# Patient Record
Sex: MALE | Race: OTHER | HISPANIC OR LATINO | URBAN - METROPOLITAN AREA
[De-identification: names, ages, dates, MRNs, and addresses within clinical notes are randomized per-mention and may not be internally consistent; named-entity substitution may affect disease eponyms.]

---

## 2021-05-29 ENCOUNTER — EMERGENCY (EMERGENCY)
Facility: HOSPITAL | Age: 56
LOS: 1 days | Discharge: HOME | End: 2021-05-29
Attending: EMERGENCY MEDICINE | Admitting: EMERGENCY MEDICINE
Payer: MEDICAID

## 2021-05-29 VITALS
TEMPERATURE: 97 F | RESPIRATION RATE: 16 BRPM | HEART RATE: 72 BPM | DIASTOLIC BLOOD PRESSURE: 49 MMHG | SYSTOLIC BLOOD PRESSURE: 93 MMHG | OXYGEN SATURATION: 100 %

## 2021-05-29 VITALS
SYSTOLIC BLOOD PRESSURE: 110 MMHG | TEMPERATURE: 98 F | HEART RATE: 60 BPM | DIASTOLIC BLOOD PRESSURE: 78 MMHG | RESPIRATION RATE: 17 BRPM | OXYGEN SATURATION: 100 %

## 2021-05-29 PROCEDURE — 70450 CT HEAD/BRAIN W/O DYE: CPT | Mod: 26

## 2021-05-29 PROCEDURE — 99285 EMERGENCY DEPT VISIT HI MDM: CPT

## 2021-05-29 PROCEDURE — 72125 CT NECK SPINE W/O DYE: CPT | Mod: 26

## 2021-05-29 RX ORDER — TETANUS TOXOID, REDUCED DIPHTHERIA TOXOID AND ACELLULAR PERTUSSIS VACCINE, ADSORBED 5; 2.5; 8; 8; 2.5 [IU]/.5ML; [IU]/.5ML; UG/.5ML; UG/.5ML; UG/.5ML
0.5 SUSPENSION INTRAMUSCULAR ONCE
Refills: 0 | Status: COMPLETED | OUTPATIENT
Start: 2021-05-29 | End: 2021-05-29

## 2021-05-29 RX ORDER — METOCLOPRAMIDE HCL 10 MG
10 TABLET ORAL ONCE
Refills: 0 | Status: COMPLETED | OUTPATIENT
Start: 2021-05-29 | End: 2021-05-29

## 2021-05-29 RX ORDER — ACETAMINOPHEN 500 MG
975 TABLET ORAL ONCE
Refills: 0 | Status: COMPLETED | OUTPATIENT
Start: 2021-05-29 | End: 2021-05-29

## 2021-05-29 RX ORDER — SODIUM CHLORIDE 9 MG/ML
1000 INJECTION, SOLUTION INTRAVENOUS ONCE
Refills: 0 | Status: COMPLETED | OUTPATIENT
Start: 2021-05-29 | End: 2021-05-29

## 2021-05-29 RX ADMIN — Medication 10 MILLIGRAM(S): at 03:34

## 2021-05-29 RX ADMIN — Medication 975 MILLIGRAM(S): at 03:34

## 2021-05-29 RX ADMIN — TETANUS TOXOID, REDUCED DIPHTHERIA TOXOID AND ACELLULAR PERTUSSIS VACCINE, ADSORBED 0.5 MILLILITER(S): 5; 2.5; 8; 8; 2.5 SUSPENSION INTRAMUSCULAR at 03:34

## 2021-05-29 RX ADMIN — SODIUM CHLORIDE 1000 MILLILITER(S): 9 INJECTION, SOLUTION INTRAVENOUS at 03:35

## 2021-05-29 NOTE — ED PROVIDER NOTE - NS ED ROS FT
GENERAL: No fever or chills  EYES: No change in vision  HEENT: No trouble swallowing or speaking  CARDIAC: No chest pain  PULMONARY: No cough or SOB  GI: No abdominal pain, no nausea or no vomiting, no diarrhea or constipation  : No changes in urination  SKIN: +laceration  NEURO: +headache; no numbness  MSK: No joint pain  Otherwise as HPI or negative.

## 2021-05-29 NOTE — ED ADULT NURSE NOTE - HOW OFTEN DO YOU HAVE SIX OR MORE DRINKS ON ONE OCCASION?
Patient discharged per MD orders. Instructions given on medications, wound care, activity, signs of infection, when to call MD, and follow up appointments. Pt verbalized understanding.  Patient AAOx3, VSS, no c/o pain or discomfort at this time. Telemetry and PIV removed. Patient ambulated off unit with spouse. Pt had even/steady gait. Pt refused wheelchair.    Small amount of bloody drainage noted to dressing. Area had been marked, small drainage outside of marked area over the course of approximately an hour. Patient ambulated in hallway with standby assist. No bleeding or hematoma noted. Dressing unchanged. Will monitor.   akua pt nonverbal and no family present Never

## 2021-05-29 NOTE — ED PROVIDER NOTE - CLINICAL SUMMARY MEDICAL DECISION MAKING FREE TEXT BOX
Jourdan Villaseñor MD (PGY-1): The patient is a 55y Male with no sig pmhx complaining of head laceration. Will give Adacel, pain control, CT head and neck, repair laceration with sutures. Pt will most likely be DCed home.

## 2021-05-29 NOTE — ED PROVIDER NOTE - ATTENDING CONTRIBUTION TO CARE
MD Sainz:  I performed a face to face bedside interview with patient regarding history of present illness, review of symptoms and past medical history. I completed an independent physical exam(documented below).  I have discussed patient's plan of care with resident.   I agree with note as stated above, having amended the EMR as needed to reflect my findings. I have discussed the assessment and plan of care.  This includes during the time I functioned as the attending physician for this patient.  PE:  Gen: Alert, NAD, inebriated  Head: NC, 3cm horizontal lac over left forehead,  EOMI, normal lids/conjunctiva  ENT:  normal hearing, patent oropharynx without erythema/exudate  Neck: +supple, no tenderness/meningismus/JVD, +Trachea midline  Chest: no chest wall tenderness, equal chest rise  Pulm: Bilateral BS, normal resp effort, no wheeze/stridor/retractions  CV: RRR, no M/R/G, +dist pulses  Abd: +BS, soft, NT/ND  Rectal: deferred  Mskel: no edema/erythema/cyanosis  Skin: no rash  Neuro: AAOx3, no sensory/motor deficits, CN 2-12 intact   MDM:  56yo M p/w forehead lac s/p mechanical fall while intoxicate (etoh). Still inebriated. CT head/neck/meds, lac repair.

## 2021-05-29 NOTE — ED PROVIDER NOTE - OBJECTIVE STATEMENT
The patient is a 55y Male complaining of head laceration. The patient is a 55y Male with no sig pmhx complaining of head laceration. Said he was drinking with ex-wife and daughter for daughter's birthday and tried to avoid stepping on a cat in their house and accidentally hit his head against the dining room table. Suffered a laceration above his left eyebrow with bleeding and headache. Family was able to apply pressure and bandage up wound. Brought pt to ED for laceration repair. Pt not on AC. Didn't take any pain meds at home. Doesn't remember last tetanus shot. No drug allergies. Denies any change in vision or other neurological deficits.

## 2021-05-29 NOTE — ED PROVIDER NOTE - NSFOLLOWUPINSTRUCTIONS_ED_ALL_ED_FT
Stitches, Staples, or Adhesive Wound Closure    Doctors use stitches (sutures), staples, and certain glue (skin adhesives) to hold your skin together while it heals (wound closure). You may need this treatment after you have surgery or if you cut your skin accidentally. These methods help your skin heal more quickly. You need to return in 5-7 days to have your sutures removed. For the first 24 hours keep the area dry, after that you can wash normally with light soap and water, do not scrub the area.    Contact your doctor if:  You have a fever.  You have chills.  You have redness, puffiness (swelling), or pain at the site of your wound.  You have fluid, blood, or pus coming from your wound.  There is a bad smell coming from your wound.  The skin edges of your wound start to separate after your sutures have been removed.  Your wound becomes thick, raised, and darker in color after your sutures come out (scarring).  This information is not intended to replace advice given to you by your health care provider. Make sure you discuss any questions you have with your health care provider. Stitches, Staples, or Adhesive Wound Closure    Doctors use stitches (sutures), staples, and certain glue (skin adhesives) to hold your skin together while it heals (wound closure). You may need this treatment after you have surgery or if you cut your skin accidentally. These methods help your skin heal more quickly. You need to return in 5-7 days to have your sutures removed. For the first 24 hours keep the area dry, after that you can wash normally with light soap and water, do not scrub the area.    Contact your doctor if:  You have a fever.  You have chills.  You have redness, puffiness (swelling), or pain at the site of your wound.  You have fluid, blood, or pus coming from your wound.  There is a bad smell coming from your wound.  The skin edges of your wound start to separate after your sutures have been removed.  Your wound becomes thick, raised, and darker in color after your sutures come out (scarring).  This information is not intended to replace advice given to you by your health care provider. Make sure you discuss any questions you have with your health care provider.    You can use 500-1000mg Tylenol every 6 hours for pain - as needed.  This is an over-the-counter medications - please respect the warnings on the label. This medication come with certain risks and side effects that you need to discuss with your doctor, especially if you are taking it for a prolonged period.    You can use 400-600mg Ibuprofen (such as motrin or advil) every 6 to 8 hours as needed for pain control.  Take ibuprofen with food or milk to lessen stomach upset.  This is an over-the-counter medication please respect the warnings on the label. All medications come with certain risks and side effects that you need to discuss with your doctor, especially if you are taking them for a prolonged period.

## 2021-05-29 NOTE — ED PROVIDER NOTE - PROGRESS NOTE DETAILS
Jourdan Villaseñor MD (PGY-1): Laceration repaired. Pt feels better with pain meds. CT scans negative for any fracture or bleed. Pt will be DCed home.

## 2021-05-29 NOTE — ED ADULT NURSE NOTE - NSIMPLEMENTINTERV_GEN_ALL_ED
Implemented All Universal Safety Interventions:  [place] to call system. Call bell, personal items and telephone within reach. Instruct patient to call for assistance. Room bathroom lighting operational. Non-slip footwear when patient is off stretcher. Physically safe environment: no spills, clutter or unnecessary equipment. Stretcher in lowest position, wheels locked, appropriate side rails in place.

## 2021-05-29 NOTE — ED ADULT TRIAGE NOTE - CHIEF COMPLAINT QUOTE
Patient BIBEMS from home for 3 in laceration to left side of forehead s/p mechanical trip and fall over cat. Patient arrives with laceration wrapped by EMS, no active bleeding noted to gauze. Patient denies LOC, blood thinner use, syncope, vision changes. Patient endorses etoh use tonight, denies daily etoh use. pt denies PMHx.

## 2021-05-29 NOTE — ED PROVIDER NOTE - PHYSICAL EXAMINATION
Gen: In mild distress. Intoxicated from alcohol. A&Ox4. Non-toxic appearing  HEENT: Normocephalic and atraumatic. EOMI, no nasal discharge, mucous membranes moist, no scleral icterus.  CV: Regular rate and rhythm, +S1/S2, no M/R/G. No significant lower extremity edema. Radial and DP pulses present and symmetrical. Capillary refill less than 2 seconds.  Resp: Normal effort and rate. CTAB, no rales, rhonchi, or wheezes.  GI: Abdomen soft, non-distended, non tender to palpation. No masses appreciated. Bowel sounds present.  MSK: No midline tenderness along c/t/l spine.  Neuro: Following commands, speaking in full sentences, moving extremities spontaneously. CN II-XII intact. Strength and sensation symmetric and intact throughout. Reflexes 2+ throughout. Cerebellar testing normal.  Psych: Appropriate mood, cooperative  Skin: 1 inch horizontal laceration above L eyebrow that is not actively bleeding with 2mm thickness.

## 2021-05-29 NOTE — ED ADULT NURSE NOTE - OBJECTIVE STATEMENT
patient was at Bday party with daughter and tripped over the cat and hit the table on his head. no pain anywhere else on body. denies any injuries anywhere else. did not pass out. no n/v. has laceration to left side of head

## 2021-05-29 NOTE — ED PROVIDER NOTE - PATIENT PORTAL LINK FT
You can access the FollowMyHealth Patient Portal offered by [facility] by registering at the following website: [email or web address]. By joining [facility]’s FollowMyHealth portal, you will also be able to view your health information using other applications (apps) compatible with our system.

## 2021-06-04 ENCOUNTER — EMERGENCY (EMERGENCY)
Facility: HOSPITAL | Age: 56
LOS: 1 days | Discharge: HOME | End: 2021-06-04
Attending: EMERGENCY MEDICINE
Payer: MEDICAID

## 2021-06-04 VITALS
SYSTOLIC BLOOD PRESSURE: 116 MMHG | RESPIRATION RATE: 16 BRPM | OXYGEN SATURATION: 95 % | HEART RATE: 71 BPM | TEMPERATURE: 98 F | DIASTOLIC BLOOD PRESSURE: 73 MMHG

## 2021-06-04 PROCEDURE — G0463: CPT

## 2021-06-04 PROCEDURE — 99283 EMERGENCY DEPT VISIT LOW MDM: CPT

## 2021-06-04 RX ORDER — CEPHALEXIN 500 MG
1 CAPSULE ORAL
Qty: 21 | Refills: 0
Start: 2021-06-04 | End: 2021-06-10

## 2021-06-04 NOTE — ED PROVIDER NOTE - ATTENDING CONTRIBUTION TO CARE
here for suture removal half the sutures removed when wound  opened up  decided to leave remaining sutures in  Follow up next week on Monday to have rest of sutures removed  agree with acps assessment

## 2021-06-04 NOTE — ED PROVIDER NOTE - OBJECTIVE STATEMENT
55 year old male presents for wound check and suture removal.  States 1 week ago he had 9 sutures placed in left eyebrow / forehead area.  no fevers, chills, bleeding from site, increased pain, redness, streaking from site or any other signs or symptoms of infection.  No dehiscence noted from wound.

## 2021-06-04 NOTE — ED PROVIDER NOTE - PATIENT PORTAL LINK FT
You can access the FollowMyHealth Patient Portal offered by [facility] by registering at the following website: [email or web address]. By joining [facility]’s FollowMyHealth portal, you will also be able to view your health information using other applications (apps) compatible with our system.

## 2021-06-04 NOTE — ED PROVIDER NOTE - PHYSICAL EXAMINATION
GEN:   comfortable, in no apparent distress, AOx3  RESP:   non-labored, speaking in full sentences  ABD:   soft, non tender, no guarding  :   no cva tenderness  MSK:   no musculoskeletal tenderness, 5/5 strength, moving all extremities  SKIN:   dry, no rash 4cm laceration upon left eyebrow.  Mild oozing of purulent discharge to distal end with dehiscence upon removal of first 4 sutures.  Mild surrounding redness, no warmth, no streaking, no eye lid swelling or redness or warmth.   NEURO:   AOx3, no focal weakness or loss of sensation, gait normal, GCS 15  PSYCH: calm, cooperative, no apparent risk to self and others

## 2021-06-04 NOTE — ED PROVIDER NOTE - CARE PROVIDER_API CALL
Jun Marrero)  Plastic Surgery  [address] [place] [address], [address] [place] [id number]  Phone: [phone]  Fax: [phone]  Follow Up Time:

## 2021-06-04 NOTE — ED PROVIDER NOTE - NSFOLLOWUPINSTRUCTIONS_ED_ALL_ED_FT
Suture/Staple Removal    After having your stitches or staples removed it is typical to have minor discomfort, swelling, or redness in the area. The wound is still healing so continue to protect it from injury. Keep the wound dry and if given creams, ointments, or medication, take as instructed to by your health care professional.    SEEK IMMEDIATE MEDICAL CARE IF YOU HAVE ANY OF THE FOLLOWING SYMPTOMS: increasing redness/swelling/pain in the wound, pus coming from the wound, bad smell coming from the wound, or fever.     Please return in 3 days for wound assessment. If any symptoms worsen of you have any fever or chills please return back to the ED immediately.

## 2021-06-04 NOTE — ED PROVIDER NOTE - CLINICAL SUMMARY MEDICAL DECISION MAKING FREE TEXT BOX
55 year old male presents for wound check and suture removal.  States 1 week ago he had 9 sutures placed in left eyebrow / forehead area. Upon removal of first 4 sutures, mild dehiscence with purulent discharge.  Remaining sutures left in place, return precautions provided, abx sent to pharmacy. Bacitracin applied to wound.  Plastics information given to patient for f/u.

## 2021-06-08 ENCOUNTER — EMERGENCY (EMERGENCY)
Facility: HOSPITAL | Age: 56
LOS: 1 days | Discharge: HOME | End: 2021-06-08
Attending: EMERGENCY MEDICINE
Payer: MEDICAID

## 2021-06-08 VITALS
HEIGHT: 66 IN | DIASTOLIC BLOOD PRESSURE: 71 MMHG | TEMPERATURE: 98 F | RESPIRATION RATE: 18 BRPM | WEIGHT: 165.79 LBS | OXYGEN SATURATION: 97 % | SYSTOLIC BLOOD PRESSURE: 105 MMHG | HEART RATE: 65 BPM

## 2021-06-08 PROCEDURE — L9993: CPT

## 2021-06-08 PROCEDURE — 99281 EMR DPT VST MAYX REQ PHY/QHP: CPT

## 2021-06-08 NOTE — ED PROVIDER NOTE - NSFOLLOWUPINSTRUCTIONS_ED_ALL_ED_FT
Follow up with the primary care doctor in 5 days for wound check.  If you experience any new or worsening symptoms or if you are concerned you can always come back to the emergency for a re-evaluation.

## 2021-06-08 NOTE — ED PROVIDER NOTE - PATIENT PORTAL LINK FT
You can access the FollowMyHealth Patient Portal offered by [facility] by registering at the following website: [email or web address]. By joining [facility]’s FollowMyHealth portal, you will also be able to view your health information using other applications (apps) compatible with our system.

## 2021-06-08 NOTE — ED PROVIDER NOTE - CLINICAL SUMMARY MEDICAL DECISION MAKING FREE TEXT BOX
Sutures removed. No signs of infection. Mild dehiscence. Will discharge with PMD follow up as needed. Wound care after suture removal explained.

## 2021-06-09 PROBLEM — Z78.9 OTHER SPECIFIED HEALTH STATUS: Chronic | Status: ACTIVE | Noted: 2021-06-04

## 2022-08-29 NOTE — ED PROVIDER NOTE - OBJECTIVE STATEMENT
Pt informed and verbalized understanding.    Labs mailed to the pt's home address  Appt scheduled  Future Appointments   Date Time Provider Tylor Reyes   9/22/2022  4:40 PM Krystal Jewell, [address] [place] [address] 55 year-old male, presents for suture removal s/p lac repair to the L forehead. Came 4 days ago and was told that needed more time to heal and to come back today. Denies any fever, chills, redness, opening of wound, drainage, bleeding, streaking, numbness, tingling or any  other complaint.